# Patient Record
Sex: FEMALE | Race: WHITE | NOT HISPANIC OR LATINO | Employment: UNEMPLOYED | URBAN - METROPOLITAN AREA
[De-identification: names, ages, dates, MRNs, and addresses within clinical notes are randomized per-mention and may not be internally consistent; named-entity substitution may affect disease eponyms.]

---

## 2017-03-15 PROBLEM — R63.5 WEIGHT GAIN: Status: ACTIVE | Noted: 2017-03-15

## 2021-03-18 PROBLEM — G43.109 MIGRAINE WITH AURA AND WITHOUT STATUS MIGRAINOSUS, NOT INTRACTABLE: Status: ACTIVE | Noted: 2021-03-18

## 2022-05-23 ENCOUNTER — TELEPHONE (OUTPATIENT)
Dept: PLASTIC SURGERY | Facility: CLINIC | Age: 32
End: 2022-05-23

## 2022-05-23 NOTE — TELEPHONE ENCOUNTER
Dr. Villa does not have any appt available for [facility] until Sept 2022.  Pt is to call back in July to schedule.

## 2022-05-23 NOTE — TELEPHONE ENCOUNTER
Pt does not speak english very well, but was told that Dr. Villa does not have any appt available for [facility] until Sept 2022.  She is to call back in July to schedule.